# Patient Record
Sex: MALE | Race: WHITE | Employment: OTHER | URBAN - METROPOLITAN AREA
[De-identification: names, ages, dates, MRNs, and addresses within clinical notes are randomized per-mention and may not be internally consistent; named-entity substitution may affect disease eponyms.]

---

## 2018-09-14 ENCOUNTER — APPOINTMENT (OUTPATIENT)
Dept: CT IMAGING | Facility: CLINIC | Age: 33
End: 2018-09-14
Attending: EMERGENCY MEDICINE
Payer: MEDICAID

## 2018-09-14 ENCOUNTER — HOSPITAL ENCOUNTER (EMERGENCY)
Facility: CLINIC | Age: 33
Discharge: HOME | End: 2018-09-14
Attending: EMERGENCY MEDICINE | Admitting: EMERGENCY MEDICINE
Payer: MEDICAID

## 2018-09-14 ENCOUNTER — APPOINTMENT (OUTPATIENT)
Dept: GENERAL RADIOLOGY | Facility: CLINIC | Age: 33
End: 2018-09-14
Attending: EMERGENCY MEDICINE
Payer: MEDICAID

## 2018-09-14 VITALS
DIASTOLIC BLOOD PRESSURE: 75 MMHG | OXYGEN SATURATION: 100 % | RESPIRATION RATE: 16 BRPM | WEIGHT: 166.45 LBS | SYSTOLIC BLOOD PRESSURE: 134 MMHG | TEMPERATURE: 98 F

## 2018-09-14 DIAGNOSIS — R10.84 ABDOMINAL PAIN, GENERALIZED: ICD-10-CM

## 2018-09-14 LAB
ABO + RH BLD: NORMAL
ABO + RH BLD: NORMAL
ALBUMIN SERPL-MCNC: 4.4 G/DL (ref 3.4–5)
ALP SERPL-CCNC: 54 U/L (ref 40–150)
ALT SERPL W P-5'-P-CCNC: 23 U/L (ref 0–70)
ANION GAP SERPL CALCULATED.3IONS-SCNC: 7 MMOL/L (ref 3–14)
APTT PPP: 28 SEC (ref 22–37)
AST SERPL W P-5'-P-CCNC: 6 U/L (ref 0–45)
BASOPHILS # BLD AUTO: 0 10E9/L (ref 0–0.2)
BASOPHILS NFR BLD AUTO: 0.3 %
BILIRUB SERPL-MCNC: 0.2 MG/DL (ref 0.2–1.3)
BLD GP AB SCN SERPL QL: NORMAL
BLOOD BANK CMNT PATIENT-IMP: NORMAL
BUN SERPL-MCNC: 27 MG/DL (ref 7–30)
CALCIUM SERPL-MCNC: 9.2 MG/DL (ref 8.5–10.1)
CHLORIDE SERPL-SCNC: 106 MMOL/L (ref 94–109)
CO2 SERPL-SCNC: 26 MMOL/L (ref 20–32)
CREAT SERPL-MCNC: 0.91 MG/DL (ref 0.66–1.25)
DIFFERENTIAL METHOD BLD: ABNORMAL
EOSINOPHIL # BLD AUTO: 0.1 10E9/L (ref 0–0.7)
EOSINOPHIL NFR BLD AUTO: 1.5 %
ERYTHROCYTE [DISTWIDTH] IN BLOOD BY AUTOMATED COUNT: 12.3 % (ref 10–15)
GFR SERPL CREATININE-BSD FRML MDRD: >90 ML/MIN/1.7M2
GLUCOSE SERPL-MCNC: 102 MG/DL (ref 70–99)
HCT VFR BLD AUTO: 44.1 % (ref 40–53)
HGB BLD-MCNC: 15.2 G/DL (ref 13.3–17.7)
IMM GRANULOCYTES # BLD: 0 10E9/L (ref 0–0.4)
IMM GRANULOCYTES NFR BLD: 0.3 %
INR PPP: 1 (ref 0.86–1.14)
LIPASE SERPL-CCNC: 118 U/L (ref 73–393)
LYMPHOCYTES # BLD AUTO: 1.1 10E9/L (ref 0.8–5.3)
LYMPHOCYTES NFR BLD AUTO: 32.2 %
MCH RBC QN AUTO: 29.7 PG (ref 26.5–33)
MCHC RBC AUTO-ENTMCNC: 34.5 G/DL (ref 31.5–36.5)
MCV RBC AUTO: 86 FL (ref 78–100)
MONOCYTES # BLD AUTO: 0.3 10E9/L (ref 0–1.3)
MONOCYTES NFR BLD AUTO: 9.6 %
NEUTROPHILS # BLD AUTO: 1.9 10E9/L (ref 1.6–8.3)
NEUTROPHILS NFR BLD AUTO: 56.1 %
NRBC # BLD AUTO: 0 10*3/UL
NRBC BLD AUTO-RTO: 0 /100
PLATELET # BLD AUTO: 238 10E9/L (ref 150–450)
POTASSIUM SERPL-SCNC: 4 MMOL/L (ref 3.4–5.3)
PROT SERPL-MCNC: 7.9 G/DL (ref 6.8–8.8)
RBC # BLD AUTO: 5.11 10E12/L (ref 4.4–5.9)
SODIUM SERPL-SCNC: 140 MMOL/L (ref 133–144)
SPECIMEN EXP DATE BLD: NORMAL
WBC # BLD AUTO: 3.4 10E9/L (ref 4–11)

## 2018-09-14 PROCEDURE — 86900 BLOOD TYPING SEROLOGIC ABO: CPT | Performed by: EMERGENCY MEDICINE

## 2018-09-14 PROCEDURE — 25000128 H RX IP 250 OP 636: Performed by: PHYSICIAN ASSISTANT

## 2018-09-14 PROCEDURE — 25000128 H RX IP 250 OP 636: Performed by: EMERGENCY MEDICINE

## 2018-09-14 PROCEDURE — 99284 EMERGENCY DEPT VISIT MOD MDM: CPT | Mod: Z6 | Performed by: EMERGENCY MEDICINE

## 2018-09-14 PROCEDURE — 25000132 ZZH RX MED GY IP 250 OP 250 PS 637: Performed by: EMERGENCY MEDICINE

## 2018-09-14 PROCEDURE — 85025 COMPLETE CBC W/AUTO DIFF WBC: CPT | Performed by: EMERGENCY MEDICINE

## 2018-09-14 PROCEDURE — 85730 THROMBOPLASTIN TIME PARTIAL: CPT | Performed by: EMERGENCY MEDICINE

## 2018-09-14 PROCEDURE — 74283 THER NMA RDCTJ INTUS/OBSTRCJ: CPT

## 2018-09-14 PROCEDURE — 96375 TX/PRO/DX INJ NEW DRUG ADDON: CPT | Performed by: EMERGENCY MEDICINE

## 2018-09-14 PROCEDURE — 96361 HYDRATE IV INFUSION ADD-ON: CPT | Performed by: EMERGENCY MEDICINE

## 2018-09-14 PROCEDURE — 86850 RBC ANTIBODY SCREEN: CPT | Performed by: EMERGENCY MEDICINE

## 2018-09-14 PROCEDURE — 83690 ASSAY OF LIPASE: CPT | Performed by: EMERGENCY MEDICINE

## 2018-09-14 PROCEDURE — 80053 COMPREHEN METABOLIC PANEL: CPT | Performed by: EMERGENCY MEDICINE

## 2018-09-14 PROCEDURE — 86901 BLOOD TYPING SEROLOGIC RH(D): CPT | Performed by: EMERGENCY MEDICINE

## 2018-09-14 PROCEDURE — 99285 EMERGENCY DEPT VISIT HI MDM: CPT | Mod: 25 | Performed by: EMERGENCY MEDICINE

## 2018-09-14 PROCEDURE — 25000125 ZZHC RX 250: Performed by: PHYSICIAN ASSISTANT

## 2018-09-14 PROCEDURE — 96374 THER/PROPH/DIAG INJ IV PUSH: CPT | Mod: 59 | Performed by: EMERGENCY MEDICINE

## 2018-09-14 PROCEDURE — 85610 PROTHROMBIN TIME: CPT | Performed by: EMERGENCY MEDICINE

## 2018-09-14 PROCEDURE — 71260 CT THORAX DX C+: CPT

## 2018-09-14 RX ORDER — IOPAMIDOL 755 MG/ML
101 INJECTION, SOLUTION INTRAVASCULAR ONCE
Status: COMPLETED | OUTPATIENT
Start: 2018-09-14 | End: 2018-09-14

## 2018-09-14 RX ORDER — HYDROMORPHONE HYDROCHLORIDE 1 MG/ML
0.5 INJECTION, SOLUTION INTRAMUSCULAR; INTRAVENOUS; SUBCUTANEOUS
Status: DISCONTINUED | OUTPATIENT
Start: 2018-09-14 | End: 2018-09-14 | Stop reason: HOSPADM

## 2018-09-14 RX ORDER — POLYETHYLENE GLYCOL 3350 17 G/17G
17 POWDER, FOR SOLUTION ORAL ONCE
Status: COMPLETED | OUTPATIENT
Start: 2018-09-14 | End: 2018-09-14

## 2018-09-14 RX ORDER — ONDANSETRON 2 MG/ML
4 INJECTION INTRAMUSCULAR; INTRAVENOUS ONCE
Status: COMPLETED | OUTPATIENT
Start: 2018-09-14 | End: 2018-09-14

## 2018-09-14 RX ORDER — POLYETHYLENE GLYCOL 3350 17 G/17G
1 POWDER, FOR SOLUTION ORAL DAILY
Qty: 527 G | Refills: 0 | Status: SHIPPED | OUTPATIENT
Start: 2018-09-14 | End: 2018-10-14

## 2018-09-14 RX ORDER — SODIUM CHLORIDE 9 MG/ML
1000 INJECTION, SOLUTION INTRAVENOUS CONTINUOUS
Status: DISCONTINUED | OUTPATIENT
Start: 2018-09-14 | End: 2018-09-14 | Stop reason: HOSPADM

## 2018-09-14 RX ADMIN — POLYETHYLENE GLYCOL 3350 17 G: 17 POWDER, FOR SOLUTION ORAL at 13:39

## 2018-09-14 RX ADMIN — IOPAMIDOL 101 ML: 755 INJECTION, SOLUTION INTRAVENOUS at 09:59

## 2018-09-14 RX ADMIN — ONDANSETRON 4 MG: 2 INJECTION INTRAMUSCULAR; INTRAVENOUS at 08:07

## 2018-09-14 RX ADMIN — SODIUM CHLORIDE 1000 ML: 9 INJECTION, SOLUTION INTRAVENOUS at 08:07

## 2018-09-14 RX ADMIN — DIATRIZOATE MEGLUMINE AND DIATRIZOATE SODIUM 480 ML: 660; 100 SOLUTION ORAL; RECTAL at 16:31

## 2018-09-14 RX ADMIN — SODIUM CHLORIDE, PRESERVATIVE FREE 75 ML: 5 INJECTION INTRAVENOUS at 09:59

## 2018-09-14 RX ADMIN — Medication 0.5 MG: at 08:07

## 2018-09-14 ASSESSMENT — ENCOUNTER SYMPTOMS
NAUSEA: 0
FEVER: 0
VOMITING: 0
CHILLS: 0

## 2018-09-14 NOTE — ED TRIAGE NOTES
Pt presents to ED for a complaint of abdominal pain. Pt reports that he had the pain a few days ago but it subsided after a BM but today the pain is back and it is not going away.

## 2018-09-14 NOTE — H&P
[facility] Surgery Consultation    Christiano Coyle MRN# [id number]   Age: 32 year old YOB: 1985     Date of Admission:  9/14/2018    Date of Consult:   9/14/18    Reason for consult: Abdominal pain       Requesting service: ED                   Assessment and Plan:   Assessment:   33 yo male presenting with abdominal pain, CT with significant stool burden and medialized cecum, no signs of obstruction or pneumatosis, hemodynamics wnl, no signs of peritonitis        Plan:   - No indication for surgical intervention    - Recommend GGE, if pain resolves may dc from surgery stand point    Discussed with staff Dr. Mars             Chief Complaint:   Abdominal pain         History of Present Illness:   33 yo male with history of bilateral inguinal hernia repair, presented to the ED with abdominal pain, he reports constipation for the last week, similar abdominal pain in the epigastric and RUQ region two days ago that resolved following a BM, today his pain started at 5 AM describes it as constant with flares, he did have BM this AM but the pain did not subside which prompted this ED visit, passing flatus, denies fever, chills, N/V, hematochezia or melena. He denies history of IBD. CT abd/pelvis reported medialized cecum, 5 cm dilation,          Past Medical History:   None          Past Surgical History:   Open right inguinal hernia repair, 2012  Open left inguinal hernia repair, 2017          Social History:     Social History   Substance Use Topics     Smoking status: Never Smoker     Smokeless tobacco: Never Used     Alcohol use No   . Reports no to recreational drugs    [employer]            Family History:   No FHx of colon or rectal cancer or IBD            Allergies:   NKA         Medications:     Current Facility-Administered Medications   Medication     HYDROmorphone (PF) (DILAUDID) injection 0.5 mg     sodium chloride 0.9% infusion     No current outpatient prescriptions on file.                Review of Systems:     10 point ROS is negative except for HPI         Physical Exam:   All vitals have been reviewed  Temp:  [98  F (36.7  C)] 98  F (36.7  C)  Heart Rate:  [70] 70  Resp:  [16] 16  BP: (135-150)/(83-99) 136/86  SpO2:  [96 %-100 %] 98 %  No intake or output data in the 24 hours ending 09/14/18 1224  Physical Exam:  Abdomen:  Alert and oriented  Non labored breathing   Non cyanotic   Soft abdomen, non distended, RUQ tenderness with deep palpation, no signs of peritonitis, has bilateral groin surgical scars          Data:   All laboratory data reviewed    Results:  BMP  Recent Labs  Lab 09/14/18  0800      POTASSIUM 4.0   CHLORIDE 106   CO2 26   BUN 27   CR 0.91   *     CBC  Recent Labs  Lab 09/14/18  0800   WBC 3.4*   HGB 15.2        LFT  Recent Labs  Lab 09/14/18  0800   AST 6   ALT 23   ALKPHOS 54   BILITOTAL 0.2   ALBUMIN 4.4   INR 1.00       Recent Labs  Lab 09/14/18  0800   *       Imaging:  CT abd/pelvis:9/14/18  1. There is no bowel obstruction as questioned.  2. Somewhat medial and superior displacement of the cecum without  evidence of obstruction or upstream dilatation to support the  diagnosis of cecal volvulus/bascule. This is nonspecific, though can  be seen in the setting of a wandering cecum, which may explain the  patient's symptomatology. Comparison to prior imaging may be useful if  available.  3. Incidentally noted horseshoe kidney.

## 2018-09-14 NOTE — ED AVS SNAPSHOT
[facility], [place], Emergency Department    [address] [address]    Phone:  [phone]                                       Christiano Coyle   MRN: [id number]    Department:  [facility] [place], Emergency Department   Date of Visit:  9/14/2018           Patient Information     Date Of Birth          1985        Your diagnoses for this visit were:     Abdominal pain, generalized        You were seen by April Beard MD and Lane Wiseman MD.      Follow-up Information     Follow up with [facility], Emergency Department.    Specialty:  EMERGENCY MEDICINE    Why:  If symptoms worsen    Contact information:    [address] [id number] [phone]    Additional information:    [facility] is located on the corner of [address] and [address] on the [facility]. It is easily accessible from virtually any point in the [facility] area, via [address] and [address].      24 Hour Appointment Hotline       To make an appointment at any [place] clinic, call [phone] ([phone]). If you don't have a family doctor or clinic, we will help you find one. [place] clinics are conveniently located to serve the needs of you and your family.             Review of your medicines      START taking        Dose / Directions Last dose taken    polyethylene glycol powder   Commonly known as:  MIRALAX   Dose:  1 capful   Quantity:  527 g        Take 17 g (1 capful) by mouth daily   Refills:  0                Prescriptions were sent or printed at these locations (1 Prescription)                   Other Prescriptions                Printed at Department/Unit printer (1 of 1)         polyethylene glycol (MIRALAX) powder                Procedures and tests performed during your visit     ABO/Rh type and screen    CBC with platelets differential    CT Chest/Abdomen/Pelvis w Contrast    Comprehensive metabolic panel    INR    Lipase    Partial  Leilani Reyes states the order for the home nurse has  "thromboplastin time    Peripheral IV catheter    XR Colon Water Soluble      Orders Needing Specimen Collection     None      Pending Results     No orders found from 2018 to 9/15/2018.            Pending Culture Results     No orders found from 2018 to 9/15/2018.            Pending Results Instructions     If you had any lab results that were not finalized at the time of your Discharge, you can call the ED Lab Result RN at [phone]. You will be contacted by this team for any positive Lab results or changes in treatment. The nurses are available 7 days a week from 10A to 6:30P.  You can leave a message 24 hours per day and they will return your call.        Thank you for choosing [facility]       Thank you for choosing [facility] for your care. Our goal is always to provide you with excellent care. Hearing back from our patients is one way we can continue to improve our services. Please take a few minutes to complete the written survey that you may receive in the mail after you visit with us. Thank you!        [facility] [employer] Information     [employer] lets you send messages to your doctor, view your test results, renew your prescriptions, schedule appointments and more. To sign up, go to [email or web address] . Click on \"Log in\" on the left side of the screen, which will take you to the Welcome page. Then click on \"Sign up Now\" on the right side of the page.     You will be asked to enter the access code listed below, as well as some personal information. Please follow the directions to create your username and password.     Your access code is: [email or web address]  Expires: 2018  6:37 PM     Your access code will  in 90 days. If you need help or a new code, please call your [facility] clinic or [phone].        Care EveryWhere ID     This is your Care EveryWhere ID. This could be used by other organizations to access your [facility] medical records  [id number]        Equal Access to Services     YVONNE TESFAYE " AH: Sammy Mason, wahusam luancaadaha, qaeddita kavladimir east. So [facility] [phone].    ATENCIÓN: Si habla español, tiene a hartmann disposición servicios gratuitos de asistencia lingüística. Llame al [phone].    We comply with applicable federal civil rights laws and [place] laws. We do not discriminate on the basis of race, color, national origin, age, disability, sex, sexual orientation, or gender identity.            After Visit Summary       This is your record. Keep this with you and show to your community pharmacist(s) and doctor(s) at your next visit.

## 2018-09-14 NOTE — PROGRESS NOTES
I saw this patient after signing from  and Tylenol.  In short this is a 32-year-old male who has had 2-3 days of abdominal pain with persistent pain today.  Denies any nausea vomiting or urinary symptoms.  CT of the abdomen initially showed possible mobile cecum concerning for volvulus-like picture.  His labs are otherwise unremarkable.  Gastrografin enema was completed which showed good opacification of the cecum itself without any evidence of volvulus.  Patient's pain is minimal at this time.  Patient otherwise safe for discharge home with close ED follow-up.  He does have some primary care follow-up which I asked him to make as soon as he is able to reestablish care for ongoing evaluation as needed.

## 2018-09-14 NOTE — ED AVS SNAPSHOT
[facility], [place], Emergency Department    [address] [place] [address]    Phone:  [phone]                                       Christiano Coyle   MRN: [id number]    Department:  [facility], Emergency Department   Date of Visit:  9/14/2018           After Visit Summary Signature Page     I have received my discharge instructions, and my questions have been answered. I have discussed any challenges I see with this plan with the nurse or doctor.    ..........................................................................................................................................  Patient/Patient Representative Signature      ..........................................................................................................................................  Patient Representative Print Name and Relationship to Patient    ..................................................               ................................................  Date                                   Time    ..........................................................................................................................................  Reviewed by Signature/Title    ...................................................              ..............................................  Date                                               Time          22EPIC Rev 08/18

## 2018-09-14 NOTE — ED PROVIDER NOTES
[place] EMERGENCY DEPARTMENT ([facility])  9/14/18   ED 22    History     Chief Complaint   Patient presents with     Abdominal Pain     HPI  Christiano Coyle is a 32 year old male who presents with abdominal pain.  He has a prior history of left inguinal hernia status post repair in 2017.  Patient states that abdominal pain began a few days ago, but this went away after having a bowel movement.  Today he developed recurrence of abdominal pain that has been persistent.  He denies any nausea vomiting no fevers or chills and no diarrhea.  He denies any urinary frequency or dysuria.    I have reviewed the Medications, Allergies, Past Medical and Surgical History, and Social History in the Epic system.    Review of Systems   Constitutional: Negative for chills and fever.   Gastrointestinal: Negative for nausea and vomiting.       Physical Exam   BP: (!) 150/99  Heart Rate: 70  Temp: 98  F (36.7  C)  Resp: 16  SpO2: 100 %      Physical Exam   Constitutional: He is oriented to person, place, and time. He appears well-developed and well-nourished. No distress.   HENT:   Head: Atraumatic.   Mouth/Throat: Oropharynx is clear and moist. No oropharyngeal exudate.   Eyes: Pupils are equal, round, and reactive to light. No scleral icterus.   Cardiovascular: Normal heart sounds and intact distal pulses.    Pulmonary/Chest: Breath sounds normal. No respiratory distress.   Abdominal: Soft. Bowel sounds are normal. There is no tenderness. There is no rebound.   Genitourinary:   Genitourinary Comments: No inguinal hernia    Musculoskeletal: He exhibits no edema or tenderness.   Neurological: He is alert and oriented to person, place, and time.   Skin: Skin is warm. No rash noted. He is not diaphoretic.   Nursing note and vitals reviewed.      ED Course     ED Course     Procedures             Critical Care time:  none             Labs Ordered and Resulted from Time of ED Arrival Up to the Time of Departure from the ED    CBC WITH PLATELETS DIFFERENTIAL - Abnormal; Notable for the following:        Result Value    WBC 3.4 (*)     All other components within normal limits   COMPREHENSIVE METABOLIC PANEL - Abnormal; Notable for the following:     Glucose 102 (*)     All other components within normal limits   LIPASE   INR   PARTIAL THROMBOPLASTIN TIME   PERIPHERAL IV CATHETER   ABO/RH TYPE AND SCREEN            Assessments & Plan (with Medical Decision Making)     In summary this is a 32-year-old gentleman with history of prior left inguinal hernia repair who comes in with intermittent abdominal pain over the last few days with market increase in intensity and prolonged duration since this morning.  Presentation is concerning for possible SBO, appendicitis, diverticulitis, biliary tract disease.  IV established labs are drawn sent reviewed and documented in epic essentially all unremarkable including normal CBC normal electrolytes and normal LFTs.  Given the patient's concern for possible SBO patient was sent to CT for imaging of the abdomen and pelvis which revealed no acute process but medial and superior displacement of the cecum which is consistent with possible wandering cecum.  Surgery was called to come and evaluate the patient with concern for possible cecal pathology including volvulus.  Recommend Gastrografin enema with repeat assessment and possible observation if symptoms persist.  Patient to be signed out to night staff pending Gastrografin enema reassessment and finalization of surgery recommendations.    I have reviewed the nursing notes.    I have reviewed the findings, diagnosis, plan and need for follow up with the patient.    New Prescriptions    No medications on file       Final diagnoses:   Abdominal pain, generalized       9/14/2018   [facility], [place], EMERGENCY DEPARTMENT     April Beard MD  09/28/18 7357